# Patient Record
Sex: MALE | Race: ASIAN | NOT HISPANIC OR LATINO | URBAN - METROPOLITAN AREA
[De-identification: names, ages, dates, MRNs, and addresses within clinical notes are randomized per-mention and may not be internally consistent; named-entity substitution may affect disease eponyms.]

---

## 2024-01-24 ENCOUNTER — OUTPATIENT (OUTPATIENT)
Dept: OUTPATIENT SERVICES | Facility: HOSPITAL | Age: 19
LOS: 1 days | End: 2024-01-24
Payer: MEDICAID

## 2024-01-24 DIAGNOSIS — M54.59 OTHER LOW BACK PAIN: ICD-10-CM

## 2024-01-24 PROCEDURE — 72110 X-RAY EXAM L-2 SPINE 4/>VWS: CPT | Mod: 26

## 2024-01-24 PROCEDURE — 72110 X-RAY EXAM L-2 SPINE 4/>VWS: CPT

## 2024-01-25 DIAGNOSIS — M54.59 OTHER LOW BACK PAIN: ICD-10-CM

## 2025-03-17 ENCOUNTER — OUTPATIENT (OUTPATIENT)
Dept: OUTPATIENT SERVICES | Facility: HOSPITAL | Age: 20
LOS: 1 days | End: 2025-03-17
Payer: MEDICAID

## 2025-03-17 DIAGNOSIS — M25.559 PAIN IN UNSPECIFIED HIP: ICD-10-CM

## 2025-03-17 PROCEDURE — 72200 X-RAY EXAM SI JOINTS: CPT

## 2025-03-17 PROCEDURE — 70220 X-RAY EXAM OF SINUSES: CPT | Mod: 26

## 2025-03-17 PROCEDURE — 73522 X-RAY EXAM HIPS BI 3-4 VIEWS: CPT | Mod: 26

## 2025-03-17 PROCEDURE — 73522 X-RAY EXAM HIPS BI 3-4 VIEWS: CPT

## 2025-03-17 PROCEDURE — 70220 X-RAY EXAM OF SINUSES: CPT

## 2025-03-17 PROCEDURE — 72200 X-RAY EXAM SI JOINTS: CPT | Mod: 26

## 2025-03-18 DIAGNOSIS — M25.559 PAIN IN UNSPECIFIED HIP: ICD-10-CM

## 2025-05-02 ENCOUNTER — EMERGENCY (EMERGENCY)
Facility: HOSPITAL | Age: 20
LOS: 0 days | Discharge: HOME | End: 2025-05-02
Attending: EMERGENCY MEDICINE
Payer: MEDICAID

## 2025-05-02 VITALS
HEART RATE: 69 BPM | OXYGEN SATURATION: 100 % | TEMPERATURE: 98 F | RESPIRATION RATE: 18 BRPM | DIASTOLIC BLOOD PRESSURE: 78 MMHG | SYSTOLIC BLOOD PRESSURE: 139 MMHG | WEIGHT: 205.03 LBS | HEIGHT: 72 IN

## 2025-05-02 PROCEDURE — 73140 X-RAY EXAM OF FINGER(S): CPT | Mod: LT

## 2025-05-02 PROCEDURE — 73140 X-RAY EXAM OF FINGER(S): CPT | Mod: 26,LT

## 2025-05-02 PROCEDURE — 99284 EMERGENCY DEPT VISIT MOD MDM: CPT

## 2025-05-02 PROCEDURE — 99283 EMERGENCY DEPT VISIT LOW MDM: CPT | Mod: 25

## 2025-05-02 RX ORDER — IBUPROFEN 200 MG
600 TABLET ORAL ONCE
Refills: 0 | Status: COMPLETED | OUTPATIENT
Start: 2025-05-02 | End: 2025-05-02

## 2025-05-02 RX ADMIN — Medication 600 MILLIGRAM(S): at 01:25

## 2025-05-02 NOTE — ED PROVIDER NOTE - NSFOLLOWUPINSTRUCTIONS_ED_ALL_ED_FT
Hematoma  A hematoma is a collection of blood under the skin, in an organ, in a body space, in a joint space, or in other tissue. The blood can thicken (clot) to form a lump that you can see and feel. The lump is often firm and may become sore and tender. Most hematomas get better in a few days to weeks. However, some hematomas may be serious and require medical care. Hematomas can range from very small to very large.    What are the causes?  This condition is caused by:    A blunt or penetrating injury.  A leakage from a blood vessel under the skin. This leak happens on its own (is spontaneous) and is more likely to occur in older people, especially those who take blood thinners.  Some medical procedures including surgeries, such as oral surgery, face lifts, and surgeries that involve the joints.  Some medical conditions that cause bleeding or bruising problems. There may be multiple hematomas that appear in different areas of the body.    What are the signs or symptoms?  Symptoms of this condition can depend on where the hematoma is located. Common symptoms of a hematoma under the skin include:    A firm lump on the body.  Pain and tenderness in the area.  Bruising. Blue, dark blue, purple-red, or yellowish skin (discoloration) may appear at the site of the hematoma if the hematoma is close to the surface of the skin.    For hematomas in deeper tissues or body spaces, symptoms may be less obvious. A collection of blood in the stomach (intra-abdominal hematoma) may cause pain in the abdomen, weakness, fainting, and shortness of breath. A collection of blood in the head (intracranial hematoma) may cause a headache or symptoms such as weakness, trouble speaking or understanding, or a change in consciousness.    How is this diagnosed?  This condition is diagnosed based on:    Your medical history.  A physical exam.  Imaging tests, such as an ultrasonogram or CT scan. These may be needed if your health care provider suspects a hematoma in deeper tissues or body spaces.  Blood tests. These may be needed if your health care provider believes that the hematoma is caused by a medical condition.    How is this treated?  This condition usually does not need treatment because many hematomas go away on their own over time. However, large hematomas, or those that may affect vital organs, may need surgical drainage or monitoring. If the hematoma is caused by a medical condition, medicines may be prescribed.    Follow these instructions at home:  Managing pain, stiffness, and swelling     If directed, apply ice to the affected area:    Put ice in a plastic bag.  Place a towel between your skin and the bag.  Leave the ice on for 20 minutes, 2–3 times a day for the first couple of days.    After applying ice for a couple of days, your health care provider may recommend that you apply warm compresses to the affected area instead. Do this as told by your health care provider. Remove the heat if your skin turns bright red. This is especially important if you are unable to feel pain, heat, or cold. You may have a greater risk of getting burned  Raise (elevate) the affected area above the level of your heart while you are sitting or lying down.  Wrap the affected area with an elastic bandage, if told by your health care provider. The bandage applies pressure (compression) to the area, which may help to reduce swelling and help the hematoma heal. Make sure the bandage is not wrapped too tight.  If your hematoma is on a leg or foot (lower extremity) and is painful, your health care provider may recommend crutches. Use them as told by your health care provider.  General instructions     Take over-the-counter and prescription medicines only as told by your health care provider.  Keep all follow-up visits as told by your health care provider. This is important.  Contact a health care provider if:  You have a fever.  The swelling or discoloration gets worse.  You develop more hematomas.  Get help right away if:  Your pain is worse or your pain is not controlled with medicine.  Your skin over the hematoma breaks or starts bleeding.  Your hematoma is in your chest or abdomen and you have weakness, shortness of breath, or a change in consciousness.  You have a hematoma on your scalp caused by a fall or injury and you have a headache that gets worse, trouble speaking or understanding, weakness, or a change in alertness or consciousness.  Summary  A hematoma is a collection of blood under the skin, in an organ, in a body space, in a joint space, or in other tissue.  This condition usually does not need treatment because many hematomas go away on their own over time.  Large hematomas, or those that may affect vital organs, may need surgical drainage or monitoring. If the hematoma is caused by a medical condition, medicines may be prescribed.

## 2025-05-02 NOTE — ED PROVIDER NOTE - PROVIDER TOKENS
PROVIDER:[TOKEN:[[id number]],FOLLOWUP:[1-3 Days]] PROVIDER:[TOKEN:[[id number]],FOLLOWUP:[1-3 Days]],PROVIDER:[TOKEN:[[id number]],FOLLOWUP:[Urgent]],PROVIDER:[TOKEN:[[id number]],FOLLOWUP:[Urgent]]

## 2025-05-02 NOTE — ED PROVIDER NOTE - CARE PROVIDERS DIRECT ADDRESSES
[email or web address]

## 2025-05-02 NOTE — ED PROVIDER NOTE - PATIENT PORTAL LINK FT
You can access the FollowMyHealth Patient Portal offered by [facility] by registering at the following website: [email or web address]. By joining [facility]’s FollowMyHealth portal, you will also be able to view your health information using other applications (apps) compatible with our system.

## 2025-05-02 NOTE — ED PROVIDER NOTE - OBJECTIVE STATEMENT
19-year-old male no significant past medical history presenting to ED for evaluation of left fourth digit pain and swelling.  States he was building a steel bridge for his school and stubbed his left fourth digit and noticed pain and swelling to area around nailbed.  Denies any numbness, tingling or weakness.

## 2025-05-02 NOTE — ED PROVIDER NOTE - PHYSICAL EXAMINATION
GENERAL: Well-nourished, Well-developed. NAD.  HEAD: NCAT  ENMT: MMM.   CVS: RRR  MSK: no bony ttp, FROM all fingers to L hand.  Skin: (+)L 4th digit swelling around nail bed, nail bed hematoma. no signs of infection  Neuro: NVI

## 2025-05-02 NOTE — ED ADULT NURSE NOTE - PRIMARY CARE PROVIDER
Refill request: Baclofen 5 MG tablet   Last refill: 9/12/24, #90 Refills - 0.     Last office visit/physical:  12/3/24  Last follow up/disposition: Return in about 6 months (around 6/3/2025) for dm, labs today.   Appointment scheduled (FP)?  No     BP Readings from Last 3 Encounters:   12/18/24 135/56   12/03/24 122/68   08/15/24 112/56       Routed to MD to advise on refill.     PCP

## 2025-05-02 NOTE — ED PROVIDER NOTE - CARE PROVIDER_API CALL
Roma Lozada  Family Medicine  [address] [place] [id number]  Phone: [phone]  Fax: [phone]  Follow Up Time: 1-3 Days   Roma Lozada  Family Medicine  [address] [place] [id number]  Phone: [phone]  Fax: [phone]  Follow Up Time: 1-3 Days    Jamison Wu  Orthopaedic Surgery  [address] [place] [address]  Phone: [phone]  Fax: [phone]  Follow Up Time: Urgent    Baldomero Soria  Plastic Surgery  [address] [place] [address]  Phone: [phone]  Fax: [phone]  Follow Up Time: Urgent

## 2025-05-06 NOTE — CHART NOTE - NSCHARTNOTEFT_GEN_A_CORE
[id number] Dr [facility] / Left message 5/5 - JL / Pt feels better 5/6 - JL    SPECIALTY: hand surgery

## 2025-05-29 ENCOUNTER — OUTPATIENT (OUTPATIENT)
Dept: OUTPATIENT SERVICES | Facility: HOSPITAL | Age: 20
LOS: 1 days | End: 2025-05-29
Payer: MEDICAID

## 2025-05-29 DIAGNOSIS — M79.642 PAIN IN LEFT HAND: ICD-10-CM

## 2025-05-29 DIAGNOSIS — M25.532 PAIN IN LEFT WRIST: ICD-10-CM

## 2025-05-29 PROCEDURE — 73130 X-RAY EXAM OF HAND: CPT | Mod: 26,LT

## 2025-05-29 PROCEDURE — 73130 X-RAY EXAM OF HAND: CPT | Mod: LT

## 2025-05-29 PROCEDURE — 73110 X-RAY EXAM OF WRIST: CPT | Mod: LT

## 2025-05-29 PROCEDURE — 73110 X-RAY EXAM OF WRIST: CPT | Mod: 26,LT

## 2025-05-30 DIAGNOSIS — M79.642 PAIN IN LEFT HAND: ICD-10-CM

## 2025-05-30 DIAGNOSIS — M25.532 PAIN IN LEFT WRIST: ICD-10-CM
